# Patient Record
Sex: MALE | Employment: FULL TIME | URBAN - METROPOLITAN AREA
[De-identification: names, ages, dates, MRNs, and addresses within clinical notes are randomized per-mention and may not be internally consistent; named-entity substitution may affect disease eponyms.]

---

## 2020-11-19 ENCOUNTER — HOSPITAL ENCOUNTER (OUTPATIENT)
Dept: OTHER | Facility: HOSPITAL | Age: 20
Discharge: HOME | End: 2020-11-19
Attending: NURSE PRACTITIONER

## 2020-11-19 ENCOUNTER — OFFICE VISIT CONVERTED (OUTPATIENT)
Dept: INTERNAL MEDICINE | Facility: CLINIC | Age: 20
End: 2020-11-19
Attending: NURSE PRACTITIONER

## 2020-11-19 LAB
ALBUMIN SERPL-MCNC: 4.9 G/DL (ref 3.5–5)
ALBUMIN/GLOB SERPL: 1.9 {RATIO} (ref 1.4–2.6)
ALP SERPL-CCNC: 76 U/L (ref 53–128)
ALT SERPL-CCNC: 12 U/L (ref 10–40)
ANION GAP SERPL CALC-SCNC: 14 MMOL/L (ref 8–19)
AST SERPL-CCNC: 18 U/L (ref 15–50)
BASOPHILS # BLD AUTO: 0.07 10*3/UL (ref 0–0.2)
BASOPHILS NFR BLD AUTO: 1.1 % (ref 0–3)
BILIRUB SERPL-MCNC: 0.55 MG/DL (ref 0.2–1.3)
BUN SERPL-MCNC: 12 MG/DL (ref 5–25)
BUN/CREAT SERPL: 11 {RATIO} (ref 6–20)
CALCIUM SERPL-MCNC: 10.1 MG/DL (ref 8.7–10.4)
CHLORIDE SERPL-SCNC: 103 MMOL/L (ref 99–111)
CHOLEST SERPL-MCNC: 137 MG/DL (ref 107–200)
CHOLEST/HDLC SERPL: 2.8 {RATIO} (ref 3–6)
CONV ABS IMM GRAN: 0.02 10*3/UL (ref 0–0.2)
CONV CO2: 27 MMOL/L (ref 22–32)
CONV IMMATURE GRAN: 0.3 % (ref 0–1.8)
CONV TOTAL PROTEIN: 7.5 G/DL (ref 6.3–8.2)
CREAT UR-MCNC: 1.11 MG/DL (ref 0.7–1.2)
DEPRECATED RDW RBC AUTO: 38.5 FL (ref 35.1–43.9)
EOSINOPHIL # BLD AUTO: 0.07 10*3/UL (ref 0–0.7)
EOSINOPHIL # BLD AUTO: 1.1 % (ref 0–7)
ERYTHROCYTE [DISTWIDTH] IN BLOOD BY AUTOMATED COUNT: 11.9 % (ref 11.6–14.4)
GFR SERPLBLD BASED ON 1.73 SQ M-ARVRAT: >60 ML/MIN/{1.73_M2}
GLOBULIN UR ELPH-MCNC: 2.6 G/DL (ref 2–3.5)
GLUCOSE SERPL-MCNC: 70 MG/DL (ref 70–99)
HCT VFR BLD AUTO: 42.2 % (ref 42–52)
HDLC SERPL-MCNC: 49 MG/DL (ref 40–60)
HGB BLD-MCNC: 14.5 G/DL (ref 14–18)
LDLC SERPL CALC-MCNC: 82 MG/DL (ref 70–100)
LYMPHOCYTES # BLD AUTO: 1.51 10*3/UL (ref 1–5)
LYMPHOCYTES NFR BLD AUTO: 23.7 % (ref 20–45)
MCH RBC QN AUTO: 30.1 PG (ref 27–31)
MCHC RBC AUTO-ENTMCNC: 34.4 G/DL (ref 33–37)
MCV RBC AUTO: 87.7 FL (ref 80–96)
MONOCYTES # BLD AUTO: 0.57 10*3/UL (ref 0.2–1.2)
MONOCYTES NFR BLD AUTO: 8.9 % (ref 3–10)
NEUTROPHILS # BLD AUTO: 4.13 10*3/UL (ref 2–8)
NEUTROPHILS NFR BLD AUTO: 64.9 % (ref 30–85)
NRBC CBCN: 0 % (ref 0–0.7)
OSMOLALITY SERPL CALC.SUM OF ELEC: 288 MOSM/KG (ref 273–304)
PLATELET # BLD AUTO: 271 10*3/UL (ref 130–400)
PMV BLD AUTO: 9.4 FL (ref 9.4–12.4)
POTASSIUM SERPL-SCNC: 4 MMOL/L (ref 3.5–5.3)
RBC # BLD AUTO: 4.81 10*6/UL (ref 4.7–6.1)
SODIUM SERPL-SCNC: 140 MMOL/L (ref 135–147)
TRIGL SERPL-MCNC: 30 MG/DL (ref 40–150)
TSH SERPL-ACNC: 1.78 M[IU]/L (ref 0.27–4.2)
VLDLC SERPL-MCNC: 6 MG/DL (ref 5–37)
WBC # BLD AUTO: 6.37 10*3/UL (ref 4.8–10.8)

## 2020-12-26 ENCOUNTER — HOSPITAL ENCOUNTER (OUTPATIENT)
Dept: MRI IMAGING | Facility: HOSPITAL | Age: 20
Discharge: HOME | End: 2020-12-26
Attending: NURSE PRACTITIONER

## 2021-03-02 ENCOUNTER — OFFICE VISIT CONVERTED (OUTPATIENT)
Dept: CARDIOLOGY | Facility: CLINIC | Age: 21
End: 2021-03-02
Attending: INTERNAL MEDICINE

## 2021-03-11 ENCOUNTER — OFFICE VISIT CONVERTED (OUTPATIENT)
Dept: CARDIOLOGY | Facility: CLINIC | Age: 21
End: 2021-03-11
Attending: INTERNAL MEDICINE

## 2021-05-10 NOTE — H&P
History and Physical      Patient Name: Alfredo Cabral   Patient ID: [id number]   Sex: Male   YOB: 2000        Visit Date: November 19, 2020    Provider: BRINA Triplett   Location: [facility]   Location Address: [address] [address], [address]  [place], [place]  [id number]   Location Phone: [phone]          Chief Complaint  · Est. care      History Of Present Illness  Alfredo Cabral is a 20 year old male who presents for evaluation and treatment of:      Previous pcp: Sandoval King  labs: none   need nuero referral for lesion on brain. dx at age 14, last MRI 2-3 yrs ago,  migraines--rizatriptan prn; 1 every other weekend  thickened wall in heart, cleared by cardio at 17, now sx: palpitations, soa, and dizzy.     Colonoscopy: n/a   Influenza vaccine: yes   Tobacco hx: never  Eye exam: long time   Dental exam: 2-3 years.     anxiety-stressing about work which he states is normal for him  he works as an        Past Medical History  Disease Name Date Onset Notes   Allergic Rhinitis --  --    Migraine headache --  --    Sinus trouble --  --          Allergy List  Allergen Name Date Reaction Notes   NO KNOWN DRUG ALLERGIES --  --  --          Social History  Finding Status Start/Stop Quantity Notes   Alcohol Never --/-- --  --    Tobacco Never --/-- --  --          Review of Systems  · Constitutional  o Denies  o : fever, fatigue, weight loss, weight gain  · Eyes  o Denies  o : double vision, blurred vision  · HENT  o Admits  o : headaches, nasal discharge  o Denies  o : vertigo, recent head injury  · Cardiovascular  o Admits  o : palpitations  o Denies  o : lower extremity edema, claudication, chest pressure  · Respiratory  o Admits  o : shortness of breath  o Denies  o : wheezing, cough, hemoptysis, dyspnea on exertion  · Gastrointestinal  o Denies  o : nausea, vomiting, diarrhea, constipation, abdominal pain  · Genitourinary  o Denies  o : dysuria,  "urinary retention  · Integument  o Denies  o : hair growth change, new skin lesions  · Neurologic  o Denies  o : altered mental status, seizures  · Musculoskeletal  o Denies  o : joint swelling, limitation of motion  · Endocrine  o Denies  o : cold intolerance, heat intolerance  · Psychiatric  o Admits  o : anxiety      Vitals  Date Time BP Position Site L\R Cuff Size HR RR TEMP (F) WT  HT  BMI kg/m2 BSA m2 O2 Sat FR L/min FiO2        11/19/2020 02:52 /70 Sitting    81 - R  98 106lbs 8oz 5'  6\" 17.19 1.5 99 %  21%          Physical Examination  · Constitutional  o Appearance  o : no acute distress, well-nourished  · Head and Face  o Head  o :   § Inspection  § : atraumatic, normocephalic  · Eyes  o Eyes  o : extraocular movements intact, no scleral icterus, no conjunctival injection  · Ears, Nose, Mouth and Throat  o Ears  o :   § External Ears  § : normal  § Otoscopic Examination  § : tympanic membrane appearance within normal limits bilaterally  o Nose  o :   § Intranasal Exam  § : nares patent  o Oral Cavity  o :   § Oral Mucosa  § : moist mucous membranes  · Neck  o Thyroid  o : gland size normal, nontender, no nodules or masses present on palpation, symmetric  · Respiratory  o Respiratory Effort  o : breathing comfortably, symmetric chest rise  o Auscultation of Lungs  o : clear to asculatation bilaterally, no wheezes, rales, or rhonchii  · Cardiovascular  o Heart  o :   § Auscultation of Heart  § : regular rate and rhythm, no murmurs, rubs, or gallops  o Peripheral Vascular System  o :   § Extremities  § : no edema  · Gastrointestinal  o Abdominal Examination  o :   § Abdomen  § : bowel sounds present, non-distended, non-tender  · Lymphatic  o Neck  o : no lymphadenopathy present  o Supraclavicular Nodes  o : no supraclavicular nodes  · Skin and Subcutaneous Tissue  o General Inspection  o : no lesions present, no areas of discoloration, skin turgor normal  · Neurologic  o Mental Status " Examination  o :   § Orientation  § : grossly oriented to person, place and time  o Gait and Station  o :   § Gait Screening  § : normal gait  · Psychiatric  o General  o : normal mood and affect          Assessment  · Allergic Rhinitis     477.0/J30.1  starting Zyrtec daily  · Migraine headache     346.90/G43.909  Will try Nurtec. Discussed medications risks and side effects. Discussed keeping headache diary discussed with neurology.  · Screening for depression     V79.0/Z13.89  · Palpitations     785.1/R00.2  EKG in the office today. will send for echo given hx. Will also set up with cardiology. discussed hydrating well, resting frequently and avoid activity where injury could easily occur with onset of sx.  · Shortness of breath     786.05/R06.02  · Dizziness     780.4/R42  · Brain lesion     348.89/G93.9  will send for MRI and neurology  · Cardiac abnormality     746.9/Q24.9    Problems Reconciled  Plan  · Orders  o ACO-39: Current medications updated and reviewed (, 1159F) - - 11/19/2020  o CARDIOLOGY CONSULTATION (CARDI) - 785.1/R00.2, 786.05/R06.02 - 11/19/2020   Dr. Henson--[facility]  o Echocardiogram - [facility] (96110, 97301, 07767) - 785.1/R00.2, 786.05/R06.02, 780.4/R42 - 11/19/2020  o NEUROLOGY CONSULTATION. (NEURO) - 348.89/G93.9 - 11/19/2020   [facility]oo-brain lesion  o MRI brain with and without contrast (94691) - 348.89/G93.9 - 11/19/2020  · Medications  o Zyrtec 10 mg oral tablet   SIG: take 1 tablet (10 mg) by oral route once daily for 90 days   DISP: (90) Tablet with 3 refills  Prescribed on 11/19/2020     o Nurtec ODT 75 mg oral tablet,disintegrating   SIG: take 1 tablet (75 mg) and place on tongue, allow to dissolve then swallow by oral route once as needed for migraine; max 1 dose/24 hrs   DISP: (8) Tablet with 0 refills  Prescribed on 11/19/2020     o Medications have been Reconciled  o Transition of Care or Provider Policy  · Instructions  o Depression Screen completed and scanned into the  EMR under the designated folder within the patient's documents.  o Patient was educated/instructed on their diagnosis, treatment and medications prior to discharge from the clinic today.  o Call the office with any concerns or questions.  · Disposition  o Call or Return if symptoms worsen or persist.  o Follow up in 2 months            Electronically Signed by: Ping Villafuerte APRN -Author on November 19, 2020 04:08:08 PM

## 2021-05-10 NOTE — PROCEDURES
Procedure Note      Patient Name: Alfredo Cabral   Patient ID: [id number]   Sex: Male   YOB: 2000    Referring Provider: Ping GONSALVES    Visit Date: March 11, 2021    Provider: Dakota Dixon MD   Location: [facility]   Location Address: [address] [place] [address], [facility]   [place]  [id number]   Location Phone: [phone]          FINAL REPORT   HOLTER MONITOR REPORT  Date: 03/11/2021   Indication: Palpitations      Interpretation Date:  03/19/2021     48-HOUR HOLTER MONITOR    FINDINGS:   The patient was monitored for 48 hours.  The average heart rate was 76 beats per minute with underlying sinus rhythm. The minimum heart rate of 48 beats per minute occurred at 4:42 AM and was sinus bradycardia. The maximum heart rate of 144 beats per minute occurred at 10:09 AM and was sinus tachycardia. Frequent mild sinus bradycardia was observed during the study, constituting 15.4% of the total beats. Likewise frequent mild sinus tachycardia was also observed, constituting 12.0% of the total beats. Very rare supraventricular ectopic activity was noted, constituting of a single isolated PAC. There was no evidence of atrial fibrillation/flutter or SVT. Rare ventricular ectopic activity was also observed, consisting of 5 isolated PVCs. There was no evidence of ventricular tachycardia. There were no prolonged pauses, and there was no evidence of atrioventricular block. The patient's one reported symptomatic episode of chest pain correlated with normal sinus rhythm, with no abnormalities observed.     CONCLUSIONS:  1.  Sinus rhythm with an average heart rate of 76 beats per minute and frequent mild sinus bradycardia and        sinus tachycardia, likely physiologic in nature.   2.  No evidence of arrhythmias.   3.  Rare isolated PACs and PVCs.   4.  No pauses or AV block. The patient's reported episode of chest pain correlated with normal sinus rhythm,        with no abnormalities observed.        Dakota Dixon MD  BAP/pap                 Electronically Signed by: Luz Harris-, Other -Author on March 20, 2021 09:15:58 AM  Electronically Co-signed by: Dakota Dixon MD -Reviewer on March 30, 2021 04:40:31 PM

## 2021-05-10 NOTE — PROCEDURES
"   Procedure Note      Patient Name: Alfredo Cabral   Patient ID: [id number]   Sex: Male   YOB: 2000    Referring Provider: Ping GONSALVES    Visit Date: March 11, 2021    Provider: Dakota Dixon MD   Location: [facility]   Location Address: [address] [place] [address], [facility]   [place], [place]  [id number]   Location Phone: [phone]          FINAL REPORT   TRANSTHORACIC ECHOCARDIOGRAM REPORT    Diagnosis: Palpitations   Height: 5'7\" Weight: 112 B/P: 108/62 BSA: 1.6   Tech: BNS   MEASUREMENTS:  RVID (Diastole) : RVID. (NORMAL: 0.7 to 2.4 cm max)   LVID (Systole): 3.0 cm (Diastole): 4.0 cm . (NORMAL: 3.7 - 5.4 cm)   Posterior Wall Thickness (Diastole): 1.0 cm. (NORMAL: 0.8 - 1.1 cm)   Septal Thickness (Diastole): 0.9 cm. (NORMAL: 0.7 - 1.2 cm)   LAID (Systole): 2.7 cm. (NORMAL: 1.9 - 3.8 cm)   Aortic Root Diameter (Diastole): 3.2 cm. (NORMAL: 2.0 - 3.7 cm)   COMMENTS:  The patient underwent 2-D, M-Mode, and Doppler examination, including pulse-wave, continuous-wave, and color-flow analysis; the study is technically adequate.   FINDINGS:  MITRAL VALVE: Mitral leaflets appeared normal and open well. No evidence of mitral valve prolapse or mitral stenosis. Trivial mitral regurgitation.   AORTIC VALVE: Trileaflet aortic valve with very mildly thickened leaflets. No evidence of aortic stenosis. Trace aortic regurgitation with a central regurgitant jet.   TRICUSPID VALVE: Normal valve morphology and leaflet excursion. Trace tricuspid regurgitation. Estimated right ventricular systolic pressure was 15-20 mmHg.   PULMONIC VALVE: Normal in structure and function. No evidence of pulmonic stenosis or pulmonic regurgitation.   AORTIC ROOT: Normal in size.   LEFT ATRIUM: Normal in size. No intracavitary thrombus or mass visualized. Color Doppler imaging across the interatrial septum did not demonstrate any evidence of PFO/shunting. Likewise, agitated saline study demonstrated no evidence of " right-to-left shunting.   LEFT VENTRICLE: Normal left ventricular size. Normal left ventricular wall thickness. No left ventricular thrombus or mass visualized. Normal left ventricular systolic function with an estimated ejection fraction of 65%. No regional wall motion abnormalities were observed. Normal diastolic filling pattern. Tissue Doppler findings were consistent with normal left ventricular filling pressure.   RIGHT VENTRICLE: Normal size and systolic function.   RIGHT ATRIUM: Normal in size.   PERICARDIUM: No pericardial effusion.   INFERIOR VENA CAVA: Normal IVC size and respirophasic changes, measures 1.8 cm.   DOPPLER: E/A ratio is 1.8.DT= 215 msec. IVRT is 56 msec. E/E' is 7.   Faxed: 03/30/2021      CONCLUSIONS:  1.  Normal left ventricular systolic function with an estimated ejection fraction of 65%.  No regional wall motion        abnormalities were observed.     2.  Normal diastolic function.   3.  Agitated saline study demonstrated no evidence of right-to-left shunting/PFO.   4.  Trace aortic regurgitation but no hemodynamically significant valvular disease.     Dakota Dixon MD  BP/rt                     Electronically Signed by: Shobha Prater-, Other -Author on March 30, 2021 03:20:42 PM  Electronically Co-signed by: Dakota Dixon MD -Reviewer on March 30, 2021 04:53:49 PM

## 2021-05-11 NOTE — H&P
History and Physical      Patient Name: Alfredo Cabral   Patient ID: [id number]   Sex: Male   YOB: 2000    Referring Provider: Ping GONSALVES    Visit Date: March 2, 2021    Provider: Dakota Dixon MD   Location: [facility]   Location Address: [address] [place] [address], [facility]   [place], [place]  [id number]   Location Phone: [phone]          Chief Complaint     Palpitations.       History Of Present Illness  Consult requested by: Ping GONSALVES   Alfredo Cabral is a pleasant 20-year-old White male with no significant past medical history who was referred here by his PCP due to recurrent episodes of intermittent palpitations. He states these episodes occur at least every other day and typically last up to several minutes before spontaneously resolving. He does report a questionable history of unspecified congenital heart disease and was previously seen at [facility] as a child. He also reports frequent migraine headaches, but no episodes of chest pain/pressure, shortness of breath, orthopnea, PND, peripheral edema, lightheadedness, or syncope. He remains moderately physically active with a stable exercise tolerance. His last EKG obtained in November 2020 showed sinus rhythm with borderline right axis deviation and changes of early repolarization, but was otherwise unremarkable.   PAST MEDICAL HISTORY: Migraine headaches; Anxiety disorder; Pilonidal cyst; No surgeries reported.   PSYCHOSOCIAL HISTORY: He does not report any history of tobacco use, alcohol abuse, or illicit drug use. He is single and lives alone.   FAMILY HISTORY: Negative for premature coronary artery disease, arrhythmia, or diabetes mellitus. Positive for hypertension in his grandfather.   CURRENT MEDICATIONS: None.   ALLERGIES: No known drug allergies.       Review of Systems  · Constitutional  o Admits  o : good general health lately  o Denies  o : fatigue, recent weight changes   · Eyes  o Admits  o : blurred  "vision  · HENT  o Denies  o : hearing loss or ringing, chronic sinus problem, swollen glands in neck  · Cardiovascular  o Admits  o : palpitations (fast, fluttering, or skipping beats)  o Denies  o : chest pain, swelling (feet, ankles, hands), shortness of breath while walking or lying flat  · Respiratory  o Denies  o : chronic or frequent cough, asthma or wheezing, COPD  · Gastrointestinal  o Admits  o : nausea or vomiting  o Denies  o : ulcers  · Neurologic  o Admits  o : headaches  o Denies  o : lightheaded or dizzy, stroke  · Musculoskeletal  o Denies  o : joint pain, back pain  · Endocrine  o Denies  o : thyroid disease, diabetes, heat or cold intolerance, excessive thirst or urination  · Heme-Lymph  o Denies  o : bleeding or bruising tendency, anemia      Vitals  Date Time BP Position Site L\R Cuff Size HR RR TEMP (F) WT  HT  BMI kg/m2 BSA m2 O2 Sat FR L/min FiO2 HC       03/02/2021 02:57 /62 Sitting    68 - R   111lbs 0oz 5'  7\" 17.38 1.54             Physical Examination  · Constitutional  o Appearance  o : Well-developed, well-nourished, alert and oriented, in no acute distress, appears stated age.  · Head and Face  o HEENT  o : Atraumatic. PERRL. No scleral icterus. Normal conjunctivae. Mucous membranes moist.   · Neck  o Inspection/Palpation  o : Supple. No masses. No thyromegaly.  o Jugular Veins  o : No JVD. No carotid bruit.  · Respiratory  o Auscultation of Lungs  o : Clear to auscultation bilaterally. No wheezing, rales, or rhonchi. No tachypnea. Normal effort with no increased work of breathing. No dullness to percussion.   · Cardiovascular  o Heart  o : Regular rate and rhythm. Normal S1 and S2. No S3 or S4 gallop. No murmur. No friction rub. PMI is not displaced.   · Gastrointestinal  o Abdominal Examination  o : Soft, nontender, nondistended. Normoactive bowel sounds in all quadrants. No masses. No hepatosplenomegaly.   · Skin and Subcutaneous Tissue  o General Inspection  o : Warm and dry. " No rashes or lesions. No jaundice. Normal skin turgor.   · Neurologic  o Cranial Nerves  o : Normal speech. Cranial nerves II-XII grossly intact. No focal motor deficits.   · Extremities  o Extremities  o : No cyanosis, clubbing, or edema. 2+ radial pulses bilaterally.   · EKG  o EKG  o : EKG from 11/19/2020 was reviewed and showed sinus rhythm with borderline right axis deviation and early repolarization changes, but was otherwise unremarkable. There is no prior EKG available for comparison.   · Labs  o Labs  o : Labs from 11/19/2020. CBC: WBC 6.37, hemoglobin 14.5, hematocrit 42.2, platelets 271,000. Chemistry: Sodium 140, potassium 4.0, chloride 103, bicarb 27, BUN 12, creatinine 1.11, glucose 70. Liver function tests were within normal limits. Fasting Lipid Panel: Total cholesterol 137, triglycerides 30, HDL 49, LDL 82.          Assessment     1.  Recurrent episodes of intermittent palpitations.  2.  Questionable history of unspecified congenital heart disease treated at [facility].  We will attempt to obtain his        old records.    3.  Chronic migraine headaches.  4.  Anxiety.       Plan     1.  We will obtain a 48-hour Holter monitor for further evaluation of the patient's recurrent palpitations.  2.  We will also check an echocardiogram to assess for structural heart disease, given the patient's palpitations        and reported history of possible unspecified congenital heart disease.  We will check a bubble study with        the echocardiogram, given the patient's chronic migraines.    3.  I will plan to see him back in the office once we have the aforementioned tests.             Electronically Signed by: Jacquelin Meier-, Other -Author on April 21, 2021 12:51:24 PM  Electronically Co-signed by: Dakota Dixon MD -Reviewer on May 5, 2021 10:48:56 AM

## 2021-05-14 VITALS
BODY MASS INDEX: 17.42 KG/M2 | DIASTOLIC BLOOD PRESSURE: 62 MMHG | HEIGHT: 67 IN | WEIGHT: 111 LBS | SYSTOLIC BLOOD PRESSURE: 108 MMHG | HEART RATE: 68 BPM

## 2021-05-14 VITALS
BODY MASS INDEX: 17.12 KG/M2 | SYSTOLIC BLOOD PRESSURE: 108 MMHG | TEMPERATURE: 98 F | DIASTOLIC BLOOD PRESSURE: 70 MMHG | HEART RATE: 81 BPM | HEIGHT: 66 IN | WEIGHT: 106.5 LBS | OXYGEN SATURATION: 99 %

## 2021-07-22 ENCOUNTER — OFFICE VISIT (OUTPATIENT)
Dept: INTERNAL MEDICINE | Facility: CLINIC | Age: 21
End: 2021-07-22

## 2021-07-22 VITALS
HEART RATE: 60 BPM | DIASTOLIC BLOOD PRESSURE: 74 MMHG | BODY MASS INDEX: 17.89 KG/M2 | OXYGEN SATURATION: 99 % | TEMPERATURE: 97.2 F | WEIGHT: 114 LBS | HEIGHT: 67 IN | SYSTOLIC BLOOD PRESSURE: 120 MMHG

## 2021-07-22 DIAGNOSIS — R19.5 CHANGE IN CONSISTENCY OF STOOL: ICD-10-CM

## 2021-07-22 DIAGNOSIS — R10.30 LOWER ABDOMINAL PAIN: Primary | ICD-10-CM

## 2021-07-22 LAB
ALBUMIN SERPL-MCNC: 5.3 G/DL (ref 3.5–5.2)
ALBUMIN/GLOB SERPL: 2.8 G/DL
ALP SERPL-CCNC: 73 U/L (ref 39–117)
ALT SERPL W P-5'-P-CCNC: 10 U/L (ref 1–41)
ANION GAP SERPL CALCULATED.3IONS-SCNC: 12.4 MMOL/L (ref 5–15)
AST SERPL-CCNC: 17 U/L (ref 1–40)
BASOPHILS # BLD AUTO: 0.07 10*3/MM3 (ref 0–0.2)
BASOPHILS NFR BLD AUTO: 1.4 % (ref 0–1.5)
BILIRUB SERPL-MCNC: 0.9 MG/DL (ref 0–1.2)
BUN SERPL-MCNC: 8 MG/DL (ref 6–20)
BUN/CREAT SERPL: 7.4 (ref 7–25)
CALCIUM SPEC-SCNC: 9.6 MG/DL (ref 8.6–10.5)
CHLORIDE SERPL-SCNC: 101 MMOL/L (ref 98–107)
CHOLEST SERPL-MCNC: 164 MG/DL (ref 0–200)
CO2 SERPL-SCNC: 26.6 MMOL/L (ref 22–29)
CREAT SERPL-MCNC: 1.08 MG/DL (ref 0.76–1.27)
CRP SERPL-MCNC: <0.3 MG/DL (ref 0–0.5)
DEPRECATED RDW RBC AUTO: 41.7 FL (ref 37–54)
EOSINOPHIL # BLD AUTO: 0.08 10*3/MM3 (ref 0–0.4)
EOSINOPHIL NFR BLD AUTO: 1.6 % (ref 0.3–6.2)
ERYTHROCYTE [DISTWIDTH] IN BLOOD BY AUTOMATED COUNT: 12.6 % (ref 12.3–15.4)
ERYTHROCYTE [SEDIMENTATION RATE] IN BLOOD: 1 MM/HR (ref 0–15)
GFR SERPL CREATININE-BSD FRML MDRD: 106 ML/MIN/1.73
GFR SERPL CREATININE-BSD FRML MDRD: 87 ML/MIN/1.73
GLOBULIN UR ELPH-MCNC: 1.9 GM/DL
GLUCOSE SERPL-MCNC: 81 MG/DL (ref 65–99)
HCT VFR BLD AUTO: 47.3 % (ref 37.5–51)
HDLC SERPL-MCNC: 43 MG/DL (ref 40–60)
HGB BLD-MCNC: 16 G/DL (ref 13–17.7)
HIV1+2 AB SER QL: NORMAL
IMM GRANULOCYTES # BLD AUTO: 0.01 10*3/MM3 (ref 0–0.05)
IMM GRANULOCYTES NFR BLD AUTO: 0.2 % (ref 0–0.5)
LDLC SERPL CALC-MCNC: 108 MG/DL (ref 0–100)
LDLC/HDLC SERPL: 2.5 {RATIO}
LYMPHOCYTES # BLD AUTO: 1.56 10*3/MM3 (ref 0.7–3.1)
LYMPHOCYTES NFR BLD AUTO: 32 % (ref 19.6–45.3)
MCH RBC QN AUTO: 30.6 PG (ref 26.6–33)
MCHC RBC AUTO-ENTMCNC: 33.8 G/DL (ref 31.5–35.7)
MCV RBC AUTO: 90.4 FL (ref 79–97)
MONOCYTES # BLD AUTO: 0.43 10*3/MM3 (ref 0.1–0.9)
MONOCYTES NFR BLD AUTO: 8.8 % (ref 5–12)
NEUTROPHILS NFR BLD AUTO: 2.73 10*3/MM3 (ref 1.7–7)
NEUTROPHILS NFR BLD AUTO: 56 % (ref 42.7–76)
NRBC BLD AUTO-RTO: 0 /100 WBC (ref 0–0.2)
PLATELET # BLD AUTO: 266 10*3/MM3 (ref 140–450)
PMV BLD AUTO: 9.6 FL (ref 6–12)
POTASSIUM SERPL-SCNC: 4.7 MMOL/L (ref 3.5–5.2)
PROT SERPL-MCNC: 7.2 G/DL (ref 6–8.5)
RBC # BLD AUTO: 5.23 10*6/MM3 (ref 4.14–5.8)
SODIUM SERPL-SCNC: 140 MMOL/L (ref 136–145)
TRIGL SERPL-MCNC: 67 MG/DL (ref 0–150)
TSH SERPL DL<=0.05 MIU/L-ACNC: 1.74 UIU/ML (ref 0.27–4.2)
VLDLC SERPL-MCNC: 13 MG/DL (ref 5–40)
WBC # BLD AUTO: 4.88 10*3/MM3 (ref 3.4–10.8)

## 2021-07-22 PROCEDURE — 84443 ASSAY THYROID STIM HORMONE: CPT | Performed by: STUDENT IN AN ORGANIZED HEALTH CARE EDUCATION/TRAINING PROGRAM

## 2021-07-22 PROCEDURE — 99214 OFFICE O/P EST MOD 30 MIN: CPT | Performed by: STUDENT IN AN ORGANIZED HEALTH CARE EDUCATION/TRAINING PROGRAM

## 2021-07-22 PROCEDURE — 86140 C-REACTIVE PROTEIN: CPT | Performed by: STUDENT IN AN ORGANIZED HEALTH CARE EDUCATION/TRAINING PROGRAM

## 2021-07-22 PROCEDURE — 85025 COMPLETE CBC W/AUTO DIFF WBC: CPT | Performed by: STUDENT IN AN ORGANIZED HEALTH CARE EDUCATION/TRAINING PROGRAM

## 2021-07-22 PROCEDURE — 85652 RBC SED RATE AUTOMATED: CPT | Performed by: STUDENT IN AN ORGANIZED HEALTH CARE EDUCATION/TRAINING PROGRAM

## 2021-07-22 PROCEDURE — 80061 LIPID PANEL: CPT | Performed by: STUDENT IN AN ORGANIZED HEALTH CARE EDUCATION/TRAINING PROGRAM

## 2021-07-22 PROCEDURE — G0432 EIA HIV-1/HIV-2 SCREEN: HCPCS | Performed by: STUDENT IN AN ORGANIZED HEALTH CARE EDUCATION/TRAINING PROGRAM

## 2021-07-22 PROCEDURE — 80053 COMPREHEN METABOLIC PANEL: CPT | Performed by: STUDENT IN AN ORGANIZED HEALTH CARE EDUCATION/TRAINING PROGRAM

## 2021-07-29 ENCOUNTER — OFFICE VISIT (OUTPATIENT)
Dept: INTERNAL MEDICINE | Facility: CLINIC | Age: 21
End: 2021-07-29

## 2021-07-29 VITALS
DIASTOLIC BLOOD PRESSURE: 72 MMHG | SYSTOLIC BLOOD PRESSURE: 118 MMHG | BODY MASS INDEX: 17.36 KG/M2 | HEART RATE: 78 BPM | HEIGHT: 67 IN | TEMPERATURE: 98 F | OXYGEN SATURATION: 99 % | WEIGHT: 110.6 LBS

## 2021-07-29 DIAGNOSIS — F41.9 ANXIETY: ICD-10-CM

## 2021-07-29 DIAGNOSIS — R10.30 LOWER ABDOMINAL PAIN: ICD-10-CM

## 2021-07-29 DIAGNOSIS — K59.00 CONSTIPATION, UNSPECIFIED CONSTIPATION TYPE: Primary | ICD-10-CM

## 2021-07-29 PROCEDURE — 99213 OFFICE O/P EST LOW 20 MIN: CPT | Performed by: STUDENT IN AN ORGANIZED HEALTH CARE EDUCATION/TRAINING PROGRAM

## 2021-07-29 RX ORDER — POLYETHYLENE GLYCOL 3350 17 G/17G
17 POWDER, FOR SOLUTION ORAL DAILY PRN
Qty: 100 EACH | Refills: 1 | Status: SHIPPED | OUTPATIENT
Start: 2021-07-29 | End: 2021-08-28

## 2024-11-14 ENCOUNTER — HOSPITAL ENCOUNTER (EMERGENCY)
Facility: HOSPITAL | Age: 24
Discharge: HOME | End: 2024-11-14
Attending: EMERGENCY MEDICINE
Payer: COMMERCIAL

## 2024-11-14 ENCOUNTER — APPOINTMENT (OUTPATIENT)
Dept: GENERAL RADIOLOGY | Facility: HOSPITAL | Age: 24
End: 2024-11-14
Payer: COMMERCIAL

## 2024-11-14 ENCOUNTER — APPOINTMENT (OUTPATIENT)
Dept: CT IMAGING | Facility: HOSPITAL | Age: 24
End: 2024-11-14
Payer: COMMERCIAL

## 2024-11-14 VITALS
BODY MASS INDEX: 18.05 KG/M2 | HEIGHT: 67 IN | TEMPERATURE: 96.6 F | DIASTOLIC BLOOD PRESSURE: 83 MMHG | WEIGHT: 115 LBS | SYSTOLIC BLOOD PRESSURE: 128 MMHG | RESPIRATION RATE: 18 BRPM | HEART RATE: 57 BPM | OXYGEN SATURATION: 99 %

## 2024-11-14 DIAGNOSIS — R42 DIZZINESS: Primary | ICD-10-CM

## 2024-11-14 LAB
ALBUMIN SERPL-MCNC: 4.8 G/DL (ref 3.5–5.2)
ALBUMIN/GLOB SERPL: 2.1 G/DL
ALP SERPL-CCNC: 73 U/L (ref 39–117)
ALT SERPL W P-5'-P-CCNC: 28 U/L (ref 1–41)
ANION GAP SERPL CALCULATED.3IONS-SCNC: 11.9 MMOL/L (ref 5–15)
AST SERPL-CCNC: 20 U/L (ref 1–40)
BASOPHILS # BLD AUTO: 0.06 10*3/MM3 (ref 0–0.2)
BASOPHILS NFR BLD AUTO: 1 % (ref 0–1.5)
BILIRUB SERPL-MCNC: 0.8 MG/DL (ref 0–1.2)
BUN SERPL-MCNC: 13 MG/DL (ref 6–20)
BUN/CREAT SERPL: 12.7 (ref 7–25)
CALCIUM SPEC-SCNC: 9.7 MG/DL (ref 8.6–10.5)
CHLORIDE SERPL-SCNC: 100 MMOL/L (ref 98–107)
CO2 SERPL-SCNC: 26.1 MMOL/L (ref 22–29)
CREAT SERPL-MCNC: 1.02 MG/DL (ref 0.76–1.27)
DEPRECATED RDW RBC AUTO: 40.5 FL (ref 37–54)
EGFRCR SERPLBLD CKD-EPI 2021: 105.3 ML/MIN/1.73
EOSINOPHIL # BLD AUTO: 0.03 10*3/MM3 (ref 0–0.4)
EOSINOPHIL NFR BLD AUTO: 0.5 % (ref 0.3–6.2)
ERYTHROCYTE [DISTWIDTH] IN BLOOD BY AUTOMATED COUNT: 12.6 % (ref 12.3–15.4)
GLOBULIN UR ELPH-MCNC: 2.3 GM/DL
GLUCOSE SERPL-MCNC: 92 MG/DL (ref 65–99)
HCT VFR BLD AUTO: 45.7 % (ref 37.5–51)
HGB BLD-MCNC: 15.9 G/DL (ref 13–17.7)
IMM GRANULOCYTES # BLD AUTO: 0.02 10*3/MM3 (ref 0–0.05)
IMM GRANULOCYTES NFR BLD AUTO: 0.3 % (ref 0–0.5)
LYMPHOCYTES # BLD AUTO: 1.29 10*3/MM3 (ref 0.7–3.1)
LYMPHOCYTES NFR BLD AUTO: 20.5 % (ref 19.6–45.3)
MCH RBC QN AUTO: 30.8 PG (ref 26.6–33)
MCHC RBC AUTO-ENTMCNC: 34.8 G/DL (ref 31.5–35.7)
MCV RBC AUTO: 88.4 FL (ref 79–97)
MONOCYTES # BLD AUTO: 0.54 10*3/MM3 (ref 0.1–0.9)
MONOCYTES NFR BLD AUTO: 8.6 % (ref 5–12)
NEUTROPHILS NFR BLD AUTO: 4.34 10*3/MM3 (ref 1.7–7)
NEUTROPHILS NFR BLD AUTO: 69.1 % (ref 42.7–76)
NRBC BLD AUTO-RTO: 0 /100 WBC (ref 0–0.2)
PLATELET # BLD AUTO: 238 10*3/MM3 (ref 140–450)
PMV BLD AUTO: 8.5 FL (ref 6–12)
POTASSIUM SERPL-SCNC: 4 MMOL/L (ref 3.5–5.2)
PROT SERPL-MCNC: 7.1 G/DL (ref 6–8.5)
RBC # BLD AUTO: 5.17 10*6/MM3 (ref 4.14–5.8)
SODIUM SERPL-SCNC: 138 MMOL/L (ref 136–145)
TROPONIN T SERPL HS-MCNC: <6 NG/L
WBC NRBC COR # BLD AUTO: 6.28 10*3/MM3 (ref 3.4–10.8)

## 2024-11-14 PROCEDURE — 99284 EMERGENCY DEPT VISIT MOD MDM: CPT

## 2024-11-14 PROCEDURE — 93005 ELECTROCARDIOGRAM TRACING: CPT | Performed by: EMERGENCY MEDICINE

## 2024-11-14 PROCEDURE — 80053 COMPREHEN METABOLIC PANEL: CPT | Performed by: EMERGENCY MEDICINE

## 2024-11-14 PROCEDURE — 93010 ELECTROCARDIOGRAM REPORT: CPT | Performed by: INTERNAL MEDICINE

## 2024-11-14 PROCEDURE — 85025 COMPLETE CBC W/AUTO DIFF WBC: CPT | Performed by: EMERGENCY MEDICINE

## 2024-11-14 PROCEDURE — 70450 CT HEAD/BRAIN W/O DYE: CPT

## 2024-11-14 PROCEDURE — 84484 ASSAY OF TROPONIN QUANT: CPT | Performed by: EMERGENCY MEDICINE

## 2024-11-14 PROCEDURE — 71045 X-RAY EXAM CHEST 1 VIEW: CPT

## 2024-11-14 RX ORDER — MECLIZINE HYDROCHLORIDE 25 MG/1
25 TABLET ORAL 3 TIMES DAILY PRN
Qty: 15 TABLET | Refills: 0 | Status: SHIPPED | OUTPATIENT
Start: 2024-11-14

## 2024-11-14 NOTE — ED PROVIDER NOTES
EMERGENCY DEPARTMENT ENCOUNTER    Room Number:  27/27  PCP: Provider, No Known  Historian: Patient      HPI:  Chief Complaint: Dizziness  A complete HPI/ROS/PMH/PSH/SH/FH are unobtainable due to: None  Context: Alfredo Cabral is a 24 y.o. male who presents to the ED c/o dizziness.  Patient has been having intermittent episodes of dizziness for several years.  Patient states today was driving and had an episode of dizziness.  States he felt both lightheaded and vertiginous.  Patient states he then had panic attack afterwards.  Had no chest pain pressure tightness.  Had no swelling.  No fevers or chills.  No focal weakness or numbness.  Patient states at 1 point he had an abnormal head image but has had 2 MRIs here that been negative.  Symptoms lasted 30 minutes.  States they get better when he puts his head between his legs.            PAST MEDICAL HISTORY  Active Ambulatory Problems     Diagnosis Date Noted    No Active Ambulatory Problems     Resolved Ambulatory Problems     Diagnosis Date Noted    No Resolved Ambulatory Problems     Past Medical History:   Diagnosis Date    Allergic rhinitis     Migraine headache     Sinus trouble          PAST SURGICAL HISTORY  History reviewed. No pertinent surgical history.      FAMILY HISTORY  History reviewed. No pertinent family history.      SOCIAL HISTORY  Social History     Socioeconomic History    Marital status: Single   Tobacco Use    Smoking status: Never    Smokeless tobacco: Never   Vaping Use    Vaping status: Never Used   Substance and Sexual Activity    Alcohol use: Never         ALLERGIES  Patient has no known allergies.        REVIEW OF SYSTEMS  Review of Systems   Lightheadedness      PHYSICAL EXAM  ED Triage Vitals   Temp Heart Rate Resp BP SpO2   11/14/24 1201 11/14/24 1201 11/14/24 1201 11/14/24 1204 11/14/24 1201   96.6 °F (35.9 °C) 73 16 117/83 99 %      Temp src Heart Rate Source Patient Position BP Location FiO2 (%)   11/14/24 1201 11/14/24 1201 --  11/14/24 1204 --   Tympanic Monitor  Right arm        Physical Exam      GENERAL: no acute distress  HENT: nares patent  EYES: no scleral icterus  CV: regular rhythm, normal rate  RESPIRATORY: normal effort  ABDOMEN: soft, nontender  MUSCULOSKELETAL: no deformity  NEURO: alert, moves all extremities, follows commands  PSYCH:  calm, cooperative  SKIN: warm, dry    Vital signs and nursing notes reviewed.          LAB RESULTS  Recent Results (from the past 24 hours)   Comprehensive Metabolic Panel    Collection Time: 11/14/24 12:28 PM    Specimen: Blood   Result Value Ref Range    Glucose 92 65 - 99 mg/dL    BUN 13 6 - 20 mg/dL    Creatinine 1.02 0.76 - 1.27 mg/dL    Sodium 138 136 - 145 mmol/L    Potassium 4.0 3.5 - 5.2 mmol/L    Chloride 100 98 - 107 mmol/L    CO2 26.1 22.0 - 29.0 mmol/L    Calcium 9.7 8.6 - 10.5 mg/dL    Total Protein 7.1 6.0 - 8.5 g/dL    Albumin 4.8 3.5 - 5.2 g/dL    ALT (SGPT) 28 1 - 41 U/L    AST (SGOT) 20 1 - 40 U/L    Alkaline Phosphatase 73 39 - 117 U/L    Total Bilirubin 0.8 0.0 - 1.2 mg/dL    Globulin 2.3 gm/dL    A/G Ratio 2.1 g/dL    BUN/Creatinine Ratio 12.7 7.0 - 25.0    Anion Gap 11.9 5.0 - 15.0 mmol/L    eGFR 105.3 >60.0 mL/min/1.73   Single High Sensitivity Troponin T    Collection Time: 11/14/24 12:28 PM    Specimen: Blood   Result Value Ref Range    HS Troponin T <6 <22 ng/L   CBC Auto Differential    Collection Time: 11/14/24 12:28 PM    Specimen: Blood   Result Value Ref Range    WBC 6.28 3.40 - 10.80 10*3/mm3    RBC 5.17 4.14 - 5.80 10*6/mm3    Hemoglobin 15.9 13.0 - 17.7 g/dL    Hematocrit 45.7 37.5 - 51.0 %    MCV 88.4 79.0 - 97.0 fL    MCH 30.8 26.6 - 33.0 pg    MCHC 34.8 31.5 - 35.7 g/dL    RDW 12.6 12.3 - 15.4 %    RDW-SD 40.5 37.0 - 54.0 fl    MPV 8.5 6.0 - 12.0 fL    Platelets 238 140 - 450 10*3/mm3    Neutrophil % 69.1 42.7 - 76.0 %    Lymphocyte % 20.5 19.6 - 45.3 %    Monocyte % 8.6 5.0 - 12.0 %    Eosinophil % 0.5 0.3 - 6.2 %    Basophil % 1.0 0.0 - 1.5 %    Immature  Grans % 0.3 0.0 - 0.5 %    Neutrophils, Absolute 4.34 1.70 - 7.00 10*3/mm3    Lymphocytes, Absolute 1.29 0.70 - 3.10 10*3/mm3    Monocytes, Absolute 0.54 0.10 - 0.90 10*3/mm3    Eosinophils, Absolute 0.03 0.00 - 0.40 10*3/mm3    Basophils, Absolute 0.06 0.00 - 0.20 10*3/mm3    Immature Grans, Absolute 0.02 0.00 - 0.05 10*3/mm3    nRBC 0.0 0.0 - 0.2 /100 WBC   ECG 12 Lead Dyspnea    Collection Time: 11/14/24 12:33 PM   Result Value Ref Range    QT Interval 382 ms    QTC Interval 392 ms       Ordered the above labs and reviewed the results.        RADIOLOGY  CT Head Without Contrast    Result Date: 11/14/2024  EMERGENCY CT SCAN OF THE HEAD WITHOUT CONTRAST ON 11/14/2040  CLINICAL HISTORY: This 24-year-old male patient who presents to emergency room with dizziness and intermittent episodes of dizziness for several years. The patient states that he was driving today and had an episode of lightheadedness and vertigo.  TECHNIQUE: Spiral CT images were obtained from the base of the skull to the vertex without intravenous contrast. The images were reformatted and are submitted in 3 mm thick axial, sagittal and coronal CT sections with brain algorithm.  There are no prior head CTs from [facility] for comparison.  FINDINGS: The brain parenchyma is normal in attenuation. The ventricles are normal in size. I see no focal mass effect. There is no midline shift. No extra-axial fluid collections are identified and there is no evidence of acute intracranial hemorrhage. The calvarium and skull base are normal in appearance. The paranasal sinuses and the mastoid air cells and middle ear cavities are clear. The orbits are normal in appearance.  There is coarse subannular calcification which is a normal variation. 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      Normal head CT. The etiology of patient's lightheadedness, dizziness and vertigo is not established on this exam. If there remains  any suspicion of an acute stroke an MRI of the brain could be obtained for more comprehensive assessment.  Radiation dose reduction techniques were utilized, including automated exposure control and exposure modulation based on body size.        XR Chest 1 View    Result Date: 11/14/2024  XR CHEST 1 VW-  Clinical: Vertigo  FINDINGS: The cardiomediastinal silhouette is normal and the lungs are clear.  CONCLUSION: Normal chest  This report was finalized on 11/14/2024 12:44 PM by Dr. Taqueria Tripp M.D on Workstation: [id number]       Ordered the above noted radiological studies.  Chest x-ray independently interpreted by me and shows no evidence of pneumonia          PROCEDURES  Procedures    EKG          EKG time: 1233  Rhythm/Rate: Normal sinus rhythm 63  P waves and DC: Normal P waves  QRS, axis: Normal QRS  ST and T waves: Normal ST-T waves with artifact    Interpreted Contemporaneously by me, independently viewed  No prior          MEDICATIONS GIVEN IN ER  Medications - No data to display                MEDICAL DECISION MAKING, PROGRESS, and CONSULTS    All labs have been independently reviewed by me.  All radiology studies have been reviewed by me and I have also reviewed the radiology report.   EKG's independently viewed and interpreted by me.  Discussion below represents my analysis of pertinent findings related to patient's condition, differential diagnosis, treatment plan and final disposition.      Additional sources:  - Discussed/ obtained information from independent historians: None    - External (non-ED) record review: Epic reviewed and patient seen 3/17/2024 for cough    - Chronic or social conditions impacting care: None    - Shared decision making: None      Orders placed during this visit:  Orders Placed This Encounter   Procedures    XR Chest 1 View    CT Head Without Contrast    Comprehensive Metabolic Panel    Single High Sensitivity Troponin T    CBC Auto Differential    ECG 12 Lead Dyspnea     CBC & Differential         Additional orders considered but not ordered:  None        Differential diagnosis includes but is not limited to:    Peripheral vertigo versus central vertigo versus dehydration      Independent interpretation of labs, radiology studies, and discussions with consultants:  ED Course as of 11/14/24 1515   Thu Nov 14, 2024   1453 14:53 EST  Patient presents for evaluation of dizziness.  Patient states he has intermittent episodes of vertigo.  States they go away when he puts his head between his legs.  States they are exacerbated by panic.  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  His blood counts and blood chemistries look okay he is asymptomatic right now.  Will discharge home to follow-up with primary provider. [SL]      ED Course User Index  [SL] Jefferson Atkins MD                 DIAGNOSIS  Final diagnoses:   Dizziness         DISPOSITION  DISCHARGE    Patient discharged in stable condition.    Reviewed implications of results, diagnosis, meds, responsibility to follow up, warning signs and symptoms of possible worsening, potential complications and reasons to return to ER, including worsening symptoms    Patient/Family voiced understanding of above instructions.    Discussed plan for discharge, as there is no emergent indication for admission. Patient referred to primary care provider for BP management due to today's BP. Pt/family is agreeable and understands need for follow up and repeat testing.  Pt is aware that discharge does not mean that nothing is wrong but it indicates no emergency is present that requires admission and they must continue care with follow-up as given below or physician of their choice.     FOLLOW-UP  PATIENT CONNECTION - [facility] [id number]  [phone]  Schedule an appointment as soon as possible for a visit            Medication List        New Prescriptions      meclizine 25 MG tablet  Commonly known as: ANTIVERT  Take 1  tablet by mouth 3 (Three) Times a Day As Needed for Dizziness.               Where to Get Your Medications        These medications were sent to [id number] - [place], [place] - [address] AT [facility] - [phone]  - [phone] FX  [address] [facility] [address]      Phone: [phone]   meclizine 25 MG tablet                  Latest Documented Vital Signs:  As of 15:15 EST  BP- 128/83 HR- 57 Temp- 96.6 °F (35.9 °C) (Tympanic) O2 sat- 99%              --    Please note that portions of this were completed with a voice recognition program.       Note Disclaimer: At [facility], we believe that sharing information builds trust and better relationships. You are receiving this note because you are receiving care at [facility] or recently visited. It is possible you will see health information before a provider has talked with you about it. This kind of information can be easy to misunderstand. To help you fully understand what it means for your health, we urge you to discuss this note with your provider.            Jefferson Atkins MD  11/14/24 2936

## 2024-11-15 LAB
QT INTERVAL: 382 MS
QTC INTERVAL: 392 MS